# Patient Record
Sex: FEMALE | Race: WHITE | NOT HISPANIC OR LATINO
[De-identification: names, ages, dates, MRNs, and addresses within clinical notes are randomized per-mention and may not be internally consistent; named-entity substitution may affect disease eponyms.]

---

## 2017-02-13 ENCOUNTER — TRANSCRIPTION ENCOUNTER (OUTPATIENT)
Age: 82
End: 2017-02-13

## 2017-03-17 ENCOUNTER — APPOINTMENT (OUTPATIENT)
Dept: NEUROLOGY | Facility: CLINIC | Age: 82
End: 2017-03-17

## 2017-03-20 ENCOUNTER — NON-APPOINTMENT (OUTPATIENT)
Age: 82
End: 2017-03-20

## 2017-03-20 ENCOUNTER — APPOINTMENT (OUTPATIENT)
Dept: CARDIOLOGY | Facility: CLINIC | Age: 82
End: 2017-03-20

## 2017-03-20 VITALS
DIASTOLIC BLOOD PRESSURE: 60 MMHG | BODY MASS INDEX: 31.99 KG/M2 | HEIGHT: 65 IN | OXYGEN SATURATION: 98 % | WEIGHT: 192 LBS | HEART RATE: 76 BPM | SYSTOLIC BLOOD PRESSURE: 93 MMHG

## 2017-03-20 DIAGNOSIS — R06.09 OTHER FORMS OF DYSPNEA: ICD-10-CM

## 2017-03-20 DIAGNOSIS — I95.9 HYPOTENSION, UNSPECIFIED: ICD-10-CM

## 2017-03-20 DIAGNOSIS — I82.409 ACUTE EMBOLISM AND THROMBOSIS OF UNSPECIFIED DEEP VEINS OF UNSPECIFIED LOWER EXTREMITY: ICD-10-CM

## 2017-03-20 DIAGNOSIS — I26.99 OTHER PULMONARY EMBOLISM W/OUT ACUTE COR PULMONALE: ICD-10-CM

## 2017-03-20 DIAGNOSIS — R94.31 ABNORMAL ELECTROCARDIOGRAM [ECG] [EKG]: ICD-10-CM

## 2017-03-20 DIAGNOSIS — R55 SYNCOPE AND COLLAPSE: ICD-10-CM

## 2017-03-20 DIAGNOSIS — R56.9 UNSPECIFIED CONVULSIONS: ICD-10-CM

## 2017-03-20 RX ORDER — FUROSEMIDE 20 MG/1
20 TABLET ORAL
Qty: 90 | Refills: 3 | Status: ACTIVE | COMMUNITY

## 2017-03-20 RX ORDER — DOCUSATE SODIUM 100 MG/1
100 CAPSULE ORAL TWICE DAILY
Refills: 0 | Status: ACTIVE | COMMUNITY

## 2017-03-20 RX ORDER — PANTOPRAZOLE SODIUM 40 MG/1
40 TABLET, DELAYED RELEASE ORAL
Refills: 0 | Status: ACTIVE | COMMUNITY

## 2017-03-20 RX ORDER — AMLODIPINE BESYLATE 2.5 MG/1
2.5 TABLET ORAL DAILY
Qty: 30 | Refills: 11 | Status: ACTIVE | COMMUNITY

## 2017-03-20 RX ORDER — CARVEDILOL 6.25 MG/1
6.25 TABLET, FILM COATED ORAL
Qty: 180 | Refills: 3 | Status: ACTIVE | COMMUNITY

## 2017-03-20 RX ORDER — RISPERIDONE 0.5 MG/1
0.5 TABLET, FILM COATED ORAL
Refills: 0 | Status: ACTIVE | COMMUNITY

## 2017-03-21 ENCOUNTER — OTHER (OUTPATIENT)
Age: 82
End: 2017-03-21

## 2017-03-27 ENCOUNTER — APPOINTMENT (OUTPATIENT)
Dept: CARDIOLOGY | Facility: CLINIC | Age: 82
End: 2017-03-27

## 2017-04-05 ENCOUNTER — APPOINTMENT (OUTPATIENT)
Dept: CARDIOLOGY | Facility: CLINIC | Age: 82
End: 2017-04-05

## 2017-04-28 ENCOUNTER — APPOINTMENT (OUTPATIENT)
Dept: NEUROLOGY | Facility: CLINIC | Age: 82
End: 2017-04-28

## 2017-07-05 ENCOUNTER — APPOINTMENT (OUTPATIENT)
Dept: UROGYNECOLOGY | Facility: CLINIC | Age: 82
End: 2017-07-05

## 2017-07-05 DIAGNOSIS — Z86.19 PERSONAL HISTORY OF OTHER INFECTIOUS AND PARASITIC DISEASES: ICD-10-CM

## 2017-07-05 DIAGNOSIS — E78.5 HYPERLIPIDEMIA, UNSPECIFIED: ICD-10-CM

## 2017-07-05 RX ORDER — NYSTATIN AND TRIAMCINOLONE ACETONIDE 100000; 1 MG/G; MG/G
100000-0.1 CREAM TOPICAL TWICE DAILY
Qty: 1 | Refills: 1 | Status: ACTIVE | COMMUNITY
Start: 2017-07-05 | End: 1900-01-01

## 2020-06-10 ENCOUNTER — OUTPATIENT (OUTPATIENT)
Dept: OUTPATIENT SERVICES | Facility: HOSPITAL | Age: 85
LOS: 1 days | End: 2020-06-10
Payer: MEDICARE

## 2020-06-10 ENCOUNTER — APPOINTMENT (OUTPATIENT)
Dept: MRI IMAGING | Facility: CLINIC | Age: 85
End: 2020-06-10
Payer: MEDICARE

## 2020-06-10 DIAGNOSIS — Z00.8 ENCOUNTER FOR OTHER GENERAL EXAMINATION: ICD-10-CM

## 2020-06-10 PROCEDURE — 70551 MRI BRAIN STEM W/O DYE: CPT | Mod: 26

## 2020-06-10 PROCEDURE — 70551 MRI BRAIN STEM W/O DYE: CPT

## 2022-08-07 ENCOUNTER — INPATIENT (INPATIENT)
Facility: HOSPITAL | Age: 87
LOS: 1 days | Discharge: HOME | DRG: 640 | End: 2022-08-09
Attending: FAMILY MEDICINE | Admitting: INTERNAL MEDICINE
Payer: MEDICARE

## 2022-08-07 VITALS
DIASTOLIC BLOOD PRESSURE: 50 MMHG | SYSTOLIC BLOOD PRESSURE: 80 MMHG | RESPIRATION RATE: 16 BRPM | WEIGHT: 184.97 LBS | OXYGEN SATURATION: 95 % | TEMPERATURE: 97 F | HEART RATE: 56 BPM

## 2022-08-07 DIAGNOSIS — R55 SYNCOPE AND COLLAPSE: ICD-10-CM

## 2022-08-07 DIAGNOSIS — I10 ESSENTIAL (PRIMARY) HYPERTENSION: ICD-10-CM

## 2022-08-07 DIAGNOSIS — G30.9 ALZHEIMER'S DISEASE, UNSPECIFIED: ICD-10-CM

## 2022-08-07 DIAGNOSIS — G93.41 METABOLIC ENCEPHALOPATHY: ICD-10-CM

## 2022-08-07 DIAGNOSIS — Z29.9 ENCOUNTER FOR PROPHYLACTIC MEASURES, UNSPECIFIED: ICD-10-CM

## 2022-08-07 DIAGNOSIS — Z86.711 PERSONAL HISTORY OF PULMONARY EMBOLISM: ICD-10-CM

## 2022-08-07 DIAGNOSIS — Z95.828 PRESENCE OF OTHER VASCULAR IMPLANTS AND GRAFTS: Chronic | ICD-10-CM

## 2022-08-07 DIAGNOSIS — E03.9 HYPOTHYROIDISM, UNSPECIFIED: ICD-10-CM

## 2022-08-07 LAB
ALBUMIN SERPL ELPH-MCNC: 3 G/DL — LOW (ref 3.3–5)
ALP SERPL-CCNC: 90 U/L — SIGNIFICANT CHANGE UP (ref 40–120)
ALT FLD-CCNC: 10 U/L — LOW (ref 12–78)
ANION GAP SERPL CALC-SCNC: 9 MMOL/L — SIGNIFICANT CHANGE UP (ref 5–17)
APPEARANCE UR: CLEAR — SIGNIFICANT CHANGE UP
AST SERPL-CCNC: 18 U/L — SIGNIFICANT CHANGE UP (ref 15–37)
BILIRUB SERPL-MCNC: 0.5 MG/DL — SIGNIFICANT CHANGE UP (ref 0.2–1.2)
BILIRUB UR-MCNC: NEGATIVE — SIGNIFICANT CHANGE UP
BUN SERPL-MCNC: 32 MG/DL — HIGH (ref 7–23)
CALCIUM SERPL-MCNC: 9.4 MG/DL — SIGNIFICANT CHANGE UP (ref 8.5–10.1)
CHLORIDE SERPL-SCNC: 104 MMOL/L — SIGNIFICANT CHANGE UP (ref 96–108)
CO2 SERPL-SCNC: 24 MMOL/L — SIGNIFICANT CHANGE UP (ref 22–31)
COLOR SPEC: YELLOW — SIGNIFICANT CHANGE UP
CREAT SERPL-MCNC: 1.5 MG/DL — HIGH (ref 0.5–1.3)
DIFF PNL FLD: ABNORMAL
EGFR: 33 ML/MIN/1.73M2 — LOW
GLUCOSE SERPL-MCNC: 192 MG/DL — HIGH (ref 70–99)
GLUCOSE UR QL: NEGATIVE — SIGNIFICANT CHANGE UP
HCT VFR BLD CALC: 34.8 % — SIGNIFICANT CHANGE UP (ref 34.5–45)
HGB BLD-MCNC: 11 G/DL — LOW (ref 11.5–15.5)
KETONES UR-MCNC: ABNORMAL
LEUKOCYTE ESTERASE UR-ACNC: ABNORMAL
MCHC RBC-ENTMCNC: 30.2 PG — SIGNIFICANT CHANGE UP (ref 27–34)
MCHC RBC-ENTMCNC: 31.6 GM/DL — LOW (ref 32–36)
MCV RBC AUTO: 95.6 FL — SIGNIFICANT CHANGE UP (ref 80–100)
NITRITE UR-MCNC: NEGATIVE — SIGNIFICANT CHANGE UP
NRBC # BLD: 0 /100 WBCS — SIGNIFICANT CHANGE UP (ref 0–0)
PH UR: 5 — SIGNIFICANT CHANGE UP (ref 5–8)
PLATELET # BLD AUTO: 188 K/UL — SIGNIFICANT CHANGE UP (ref 150–400)
POTASSIUM SERPL-MCNC: 4.7 MMOL/L — SIGNIFICANT CHANGE UP (ref 3.5–5.3)
POTASSIUM SERPL-SCNC: 4.7 MMOL/L — SIGNIFICANT CHANGE UP (ref 3.5–5.3)
PROT SERPL-MCNC: 7.4 G/DL — SIGNIFICANT CHANGE UP (ref 6–8.3)
PROT UR-MCNC: 30 MG/DL
RBC # BLD: 3.64 M/UL — LOW (ref 3.8–5.2)
RBC # FLD: 13 % — SIGNIFICANT CHANGE UP (ref 10.3–14.5)
SODIUM SERPL-SCNC: 137 MMOL/L — SIGNIFICANT CHANGE UP (ref 135–145)
SP GR SPEC: 1.02 — SIGNIFICANT CHANGE UP (ref 1.01–1.02)
TROPONIN I, HIGH SENSITIVITY RESULT: 13.8 NG/L — SIGNIFICANT CHANGE UP
UROBILINOGEN FLD QL: NEGATIVE — SIGNIFICANT CHANGE UP
WBC # BLD: 13.15 K/UL — HIGH (ref 3.8–10.5)
WBC # FLD AUTO: 13.15 K/UL — HIGH (ref 3.8–10.5)

## 2022-08-07 PROCEDURE — 99223 1ST HOSP IP/OBS HIGH 75: CPT | Mod: GC

## 2022-08-07 PROCEDURE — 71045 X-RAY EXAM CHEST 1 VIEW: CPT | Mod: 26

## 2022-08-07 PROCEDURE — 70450 CT HEAD/BRAIN W/O DYE: CPT | Mod: 26,MA

## 2022-08-07 PROCEDURE — 99285 EMERGENCY DEPT VISIT HI MDM: CPT

## 2022-08-07 RX ORDER — QUETIAPINE FUMARATE 200 MG/1
25 TABLET, FILM COATED ORAL AT BEDTIME
Refills: 0 | Status: DISCONTINUED | OUTPATIENT
Start: 2022-08-07 | End: 2022-08-09

## 2022-08-07 RX ORDER — SODIUM CHLORIDE 9 MG/ML
1000 INJECTION INTRAMUSCULAR; INTRAVENOUS; SUBCUTANEOUS
Refills: 0 | Status: DISCONTINUED | OUTPATIENT
Start: 2022-08-07 | End: 2022-08-07

## 2022-08-07 RX ORDER — CEFTRIAXONE 500 MG/1
1000 INJECTION, POWDER, FOR SOLUTION INTRAMUSCULAR; INTRAVENOUS EVERY 24 HOURS
Refills: 0 | Status: DISCONTINUED | OUTPATIENT
Start: 2022-08-07 | End: 2022-08-08

## 2022-08-07 RX ORDER — MEMANTINE HYDROCHLORIDE 10 MG/1
15 TABLET ORAL DAILY
Refills: 0 | Status: DISCONTINUED | OUTPATIENT
Start: 2022-08-07 | End: 2022-08-09

## 2022-08-07 RX ORDER — SODIUM CHLORIDE 9 MG/ML
1000 INJECTION INTRAMUSCULAR; INTRAVENOUS; SUBCUTANEOUS ONCE
Refills: 0 | Status: COMPLETED | OUTPATIENT
Start: 2022-08-07 | End: 2022-08-07

## 2022-08-07 RX ORDER — CARVEDILOL PHOSPHATE 80 MG/1
6.25 CAPSULE, EXTENDED RELEASE ORAL EVERY 12 HOURS
Refills: 0 | Status: DISCONTINUED | OUTPATIENT
Start: 2022-08-07 | End: 2022-08-09

## 2022-08-07 RX ORDER — SODIUM CHLORIDE 9 MG/ML
1000 INJECTION INTRAMUSCULAR; INTRAVENOUS; SUBCUTANEOUS
Refills: 0 | Status: COMPLETED | OUTPATIENT
Start: 2022-08-07 | End: 2022-08-08

## 2022-08-07 RX ORDER — HEPARIN SODIUM 5000 [USP'U]/ML
5000 INJECTION INTRAVENOUS; SUBCUTANEOUS EVERY 8 HOURS
Refills: 0 | Status: DISCONTINUED | OUTPATIENT
Start: 2022-08-07 | End: 2022-08-09

## 2022-08-07 RX ORDER — LOSARTAN POTASSIUM 100 MG/1
50 TABLET, FILM COATED ORAL DAILY
Refills: 0 | Status: DISCONTINUED | OUTPATIENT
Start: 2022-08-07 | End: 2022-08-09

## 2022-08-07 RX ORDER — HYDROCHLOROTHIAZIDE 25 MG
12.5 TABLET ORAL DAILY
Refills: 0 | Status: DISCONTINUED | OUTPATIENT
Start: 2022-08-07 | End: 2022-08-09

## 2022-08-07 RX ORDER — LORATADINE 10 MG/1
10 TABLET ORAL DAILY
Refills: 0 | Status: DISCONTINUED | OUTPATIENT
Start: 2022-08-07 | End: 2022-08-09

## 2022-08-07 RX ORDER — PANTOPRAZOLE SODIUM 20 MG/1
20 TABLET, DELAYED RELEASE ORAL
Refills: 0 | Status: DISCONTINUED | OUTPATIENT
Start: 2022-08-07 | End: 2022-08-09

## 2022-08-07 RX ORDER — SIMVASTATIN 20 MG/1
20 TABLET, FILM COATED ORAL AT BEDTIME
Refills: 0 | Status: DISCONTINUED | OUTPATIENT
Start: 2022-08-07 | End: 2022-08-09

## 2022-08-07 RX ORDER — SODIUM CHLORIDE 9 MG/ML
1000 INJECTION INTRAMUSCULAR; INTRAVENOUS; SUBCUTANEOUS ONCE
Refills: 0 | Status: DISCONTINUED | OUTPATIENT
Start: 2022-08-07 | End: 2022-08-07

## 2022-08-07 RX ORDER — LEVOTHYROXINE SODIUM 125 MCG
112 TABLET ORAL DAILY
Refills: 0 | Status: DISCONTINUED | OUTPATIENT
Start: 2022-08-07 | End: 2022-08-09

## 2022-08-07 RX ORDER — DONEPEZIL HYDROCHLORIDE 10 MG/1
10 TABLET, FILM COATED ORAL AT BEDTIME
Refills: 0 | Status: DISCONTINUED | OUTPATIENT
Start: 2022-08-07 | End: 2022-08-08

## 2022-08-07 RX ORDER — ASPIRIN/CALCIUM CARB/MAGNESIUM 324 MG
81 TABLET ORAL DAILY
Refills: 0 | Status: DISCONTINUED | OUTPATIENT
Start: 2022-08-07 | End: 2022-08-09

## 2022-08-07 RX ORDER — CEFTRIAXONE 500 MG/1
1000 INJECTION, POWDER, FOR SOLUTION INTRAMUSCULAR; INTRAVENOUS EVERY 24 HOURS
Refills: 0 | Status: DISCONTINUED | OUTPATIENT
Start: 2022-08-07 | End: 2022-08-07

## 2022-08-07 RX ADMIN — CARVEDILOL PHOSPHATE 6.25 MILLIGRAM(S): 80 CAPSULE, EXTENDED RELEASE ORAL at 20:08

## 2022-08-07 RX ADMIN — SIMVASTATIN 20 MILLIGRAM(S): 20 TABLET, FILM COATED ORAL at 21:46

## 2022-08-07 RX ADMIN — QUETIAPINE FUMARATE 25 MILLIGRAM(S): 200 TABLET, FILM COATED ORAL at 21:47

## 2022-08-07 RX ADMIN — SODIUM CHLORIDE 1000 MILLILITER(S): 9 INJECTION INTRAMUSCULAR; INTRAVENOUS; SUBCUTANEOUS at 14:20

## 2022-08-07 RX ADMIN — SODIUM CHLORIDE 100 MILLILITER(S): 9 INJECTION INTRAMUSCULAR; INTRAVENOUS; SUBCUTANEOUS at 19:52

## 2022-08-07 RX ADMIN — HEPARIN SODIUM 5000 UNIT(S): 5000 INJECTION INTRAVENOUS; SUBCUTANEOUS at 21:50

## 2022-08-07 RX ADMIN — DONEPEZIL HYDROCHLORIDE 10 MILLIGRAM(S): 10 TABLET, FILM COATED ORAL at 21:46

## 2022-08-07 RX ADMIN — CEFTRIAXONE 100 MILLIGRAM(S): 500 INJECTION, POWDER, FOR SOLUTION INTRAMUSCULAR; INTRAVENOUS at 19:52

## 2022-08-07 NOTE — H&P ADULT - HISTORY OF PRESENT ILLNESS
58 yo female with PMHx of alzheimers dementia, HTN, hypothyroidism, PE (s/p IVC filter 6-7 yrs ago) presents to the ED after 2 episodes of slumping over at home. History obtained from the son. Son states that pt had 2 episodes of slumping over for a few seconds. First episode was witnessed by pts . States that pt got tired during a conversation, slumped over a little bit with her eyes closed, and when sat up by family member she woke up and spit up. Second episode was witnessed by son. Son stated that she slumped over, closed her eyes, and then when he sat her up she vomited watery clear fluid. Family states that pt has chronically decreased PO intake.  states that she gets about 8 oz of water a day. Family states that this has never happened in the past. No recent sick contacts.     ED course  Vitals: T 97.2 , HR 56 , BP 80/50, RR 16, SpO2 95 % on RA  Significant labs: WBC 13.15, H/H 11.0/34.8, BUN 32, Cr 1.5  UA showed trace ketones, 30 protein, moderate leuk esterase, trace blood, occasional bacteria  Imaging: CT showed mild periventricular white matter ischemia   EKG: f/u   In ED patient given: NS bolus x2    58 yo female with PMHx of alzheimers dementia, HTN, hypothyroidism, PE (s/p IVC filter 6-7 yrs ago) presents to the ED after 2 episodes of slumping over at home. History obtained from the son. Son states that pt had 2 episodes of slumping over for a few seconds. First episode was witnessed by pts . States that pt got tired during a conversation, slumped over a little bit with her eyes closed, and when sat up by family member she woke up and spit up. Second episode was witnessed by son. Son stated that she slumped over, closed her eyes, and then when he sat her up she vomited watery clear fluid. Family states that pt has chronically decreased PO intake.  states that she gets about 8 oz of water a day. Family states that this has never happened in the past. No recent sick contacts.     ED course  Vitals: T 97.2 , HR 56 , BP 80/50, RR 16, SpO2 95 % on RA  Significant labs: WBC 13.15, H/H 11.0/34.8, BUN 32, Cr 1.5  UA showed trace ketones, 30 protein, moderate leuk esterase, trace blood, occasional bacteria  Imaging: CT showed mild periventricular white matter ischemia   EKG performed   In ED patient given: NS bolus x2

## 2022-08-07 NOTE — H&P ADULT - ATTENDING COMMENTS
58 yo female with PMHx of alzheimers dementia, HTN, hypothyroidism, PE (s/p IVC filter 6-7 yrs ago) presents to the ED after 2 episodes of slumping over at home. Admitted for metabolic encephalopathy, and what appears to have been syncopal episodes at home    CT head negative.  Pt s/p 2 L NS boluses in ED, will cont IVF NS, 2d echo, carotid dopplers, monitor on tele, speech eval; cards consult, neuro consult.  UA positive for UTI, started on ceftriaxone, may be the etiology of syncope.   CXR as pt has been c/o cough, but ove rlats several weeks, not acutely, and speech eval to r/o aspiration.    Brayden Matt, Attending Physician

## 2022-08-07 NOTE — H&P ADULT - PROBLEM SELECTOR PLAN 2
INTERDISCIPLINARY PLAN OF CARE CONFERENCE    Date/Time: 4/16/2021 12:06 PM  Completed by: Sandra Nicole Case Management      Patient Name:  Beatriz Mcneal  YOB: 1960  Admitting Diagnosis: Bradycardia [R00.1]     Admit Date/Time:  4/15/2021  4:08 PM    Chart reviewed. Interdisciplinary team contacted or reviewed plan related to patient progress and discharge plans. Disciplines included Case Management, Nursing, and Dietitian. Discharge Plan Comments: Reviewed chart. Review of chart for any potential discharge needs. No needs identified for discharge intervention at this time. MD and bedside RN  if needs arise please consult case management for discharge intervention. CM not following at this time. Pt is on RA at 98%. [facility] info was placed in d/c instructions for pt to make appt.
- chronic   - continue home memantine and donepezil  - Aspiration precautions

## 2022-08-07 NOTE — H&P ADULT - NSHPSOCIALHISTORY_GEN_ALL_CORE
Tobacco: none  EtOH: none  Recreational drug use: none  Lives with:   Ambulates: wheelchair  ADLs: needs assistance

## 2022-08-07 NOTE — PATIENT PROFILE ADULT - FALL HARM RISK - CONCLUSION
[FreeTextEntry1] : Mrs. Patrick is a 64 Fall with Harm Risk

## 2022-08-07 NOTE — PATIENT PROFILE ADULT - VISION (WITH CORRECTIVE LENSES IF THE PATIENT USUALLY WEARS THEM):
Glasses at home/Partially impaired: cannot see medication labels or newsprint, but can see obstacles in path, and the surrounding layout; can count fingers at arm's length

## 2022-08-07 NOTE — H&P ADULT - PROBLEM SELECTOR PLAN 3
- BP on admission 80/50  - Hold BP meds in setting of hypotension   - restart meds when pt hemodynamically stable   - monitor hemodynamics - BP on admission 80/50  - Hold BP meds in setting of hypotension   - consider restart meds when pt hemodynamically stable   - monitor hemodynamics

## 2022-08-07 NOTE — ED PROVIDER NOTE - OBJECTIVE STATEMENT
88 yo with female with Dementia who per son (witness to events) saw patient=mother suddenly slump over in chair for a period of a few minutes at which time she was unresponsive. A similar episode happened shortly thereafter at which time EMS was called who transported patient to ED. Per son patient has not be taking in enough fluids. Denies any recent falls or head injury. States that her mental status is at baseline at this time and their have not been any recent changes in her mental status. No other history is available at this time.

## 2022-08-07 NOTE — H&P ADULT - NSHPPHYSICALEXAM_GEN_ALL_CORE
T(C): 36.6 (08-07-22 @ 14:17), Max: 36.6 (08-07-22 @ 14:17)  HR: 56 (08-07-22 @ 13:48) (56 - 56)  BP: 80/50 (08-07-22 @ 13:48) (80/50 - 80/50)  RR: 16 (08-07-22 @ 13:48) (16 - 16)  SpO2: 95% (08-07-22 @ 13:48) (95% - 95%)    GENERAL: patient appears lethargic, no acute distress  EYES: sclera clear, no exudates  ENMT: oropharynx clear without erythema, no exudates, moist mucous membranes  NECK: supple, soft  LUNGS: good air entry bilaterally, clear to auscultation, symmetric breath sounds, no wheezing or rhonchi appreciated  HEART: soft S1/S2, regular rate and rhythm, no murmurs noted, no lower extremity edema  GASTROINTESTINAL: abdomen is soft, nontender, nondistended, normoactive bowel sounds  INTEGUMENT: poor skin turgor, warm skin, appears dehydrated   MUSCULOSKELETAL: no clubbing or cyanosis, no obvious deformity  EXTREMITIES: +1 pitting edema BL LE (chronic per family)  NEUROLOGIC: awake, alert, oriented x0, good muscle tone in all 4 extremities  HEME/LYMPH: no obvious ecchymosis or petechiae

## 2022-08-07 NOTE — ED ADULT NURSE NOTE - OBJECTIVE STATEMENT
patient came in ED from home BIBA with c/o possible syncope as per the Son. Son stated patient was seated on her chair at home when he noticed her slumped over to the left side and unresponsive. 911 was called. as soon as EMS came patient became responsive and refused to go with them. on arrival, patient was noted awake, confused. cooperative. straight cath done for urine specimen, no urine output noted.

## 2022-08-07 NOTE — H&P ADULT - PROBLEM SELECTOR PLAN 1
- pt came into the ED after 2 episodes of syncope and vomiting clear watery liquid (witnessed)   - likely due to UTI/dehydration  - CT head showen mild periventricular white matter ischemia   - UA showed trace ketones, 30 protein, mod leuk est, trace blood, occasional bacteria  - On admission, WBC count 13.15, H/H 11.0/34.8   - s/p NS bolus x 2 in ED   - start IVF  cc/hr   - monitor hemodynamics - pt came into the ED after 2 episodes of syncope and vomiting clear watery liquid (witnessed)   - does not meet sepsis criteria  - chronic low PO intake   - likely due to UTI/dehydration  - CT head showed mild periventricular white matter ischemia   - UA showed trace ketones, 30 protein, mod leuk est, trace blood, occasional bacteria  - On admission, WBC count 13.15, H/H 11.0/34.8   - s/p NS bolus x 2 in ED   - start IVF  cc/hr   - monitor hemodynamics - pt came into the ED after 2 episodes of syncope and vomiting clear watery liquid (witnessed)   - does not meet sepsis criteria  - chronic low PO intake   - likely due to UTI/dehydration  - CT head showed mild periventricular white matter ischemia   - UA showed trace ketones, 30 protein, mod leuk est, trace blood, occasional bacteria  - On admission, WBC count 13.15, H/H 11.0/34.8   - s/p NS bolus x 2 in ED   - monitor on tele   - start IVF  cc/hr, stop after 12 hrs; monitor volume status   - start Ceftriaxone 1 g x 3 days   - f/u CXR. echo, carotid dopplers   - Speech and Swallow consulted, f/u recs  - PT consulted, f/u recs  - cardio consulted, f/u recs  - neuro consulted, f/u recs  - monitor hemodynamics

## 2022-08-07 NOTE — PATIENT PROFILE ADULT - FALL HARM RISK - HARM RISK INTERVENTIONS

## 2022-08-07 NOTE — H&P ADULT - ASSESSMENT
58 yo female with PMHx of alzheimers dementia, HTN, hypothyroidism, PE (s/p IVC filter 6-7 yrs ago) presents to the ED after 2 episodes of slumping over at home. Admitted for AMS/syncope/weakness. 58 yo female with PMHx of alzheimers dementia, HTN, hypothyroidism, PE (s/p IVC filter 6-7 yrs ago) presents to the ED after 2 episodes of slumping over at home. Admitted for metabolic encephalopathy

## 2022-08-07 NOTE — PATIENT PROFILE ADULT - WILL THE PATIENT ACCEPT THE PFIZER COVID-19 VACCINE IF ELIGIBLE AND IT IS AVAILABLE?
Patient states productive cough x 2 weeks . States green sputum. Patient states:  'I feel weak and short of breath\". Denies asthma hx.
No

## 2022-08-07 NOTE — H&P ADULT - NSICDXPASTMEDICALHX_GEN_ALL_CORE_FT
PAST MEDICAL HISTORY:  Alzheimer's dementia     Dementia     History of pulmonary embolism     HTN (hypertension)     Hypothyroidism

## 2022-08-07 NOTE — ED PROVIDER NOTE - CONSTITUTIONAL, MLM
normal... Chronically ill appearing elderly white female, flat affect, blank type of stare, awake, in no apparent distress.

## 2022-08-08 LAB
ALBUMIN SERPL ELPH-MCNC: 2.5 G/DL — LOW (ref 3.3–5)
ALP SERPL-CCNC: 76 U/L — SIGNIFICANT CHANGE UP (ref 40–120)
ALT FLD-CCNC: 10 U/L — LOW (ref 12–78)
ANION GAP SERPL CALC-SCNC: 7 MMOL/L — SIGNIFICANT CHANGE UP (ref 5–17)
AST SERPL-CCNC: 13 U/L — LOW (ref 15–37)
BASOPHILS # BLD AUTO: 0.02 K/UL — SIGNIFICANT CHANGE UP (ref 0–0.2)
BASOPHILS NFR BLD AUTO: 0.3 % — SIGNIFICANT CHANGE UP (ref 0–2)
BILIRUB SERPL-MCNC: 0.4 MG/DL — SIGNIFICANT CHANGE UP (ref 0.2–1.2)
BUN SERPL-MCNC: 31 MG/DL — HIGH (ref 7–23)
CALCIUM SERPL-MCNC: 8.7 MG/DL — SIGNIFICANT CHANGE UP (ref 8.5–10.1)
CHLORIDE SERPL-SCNC: 109 MMOL/L — HIGH (ref 96–108)
CO2 SERPL-SCNC: 25 MMOL/L — SIGNIFICANT CHANGE UP (ref 22–31)
CREAT SERPL-MCNC: 1.1 MG/DL — SIGNIFICANT CHANGE UP (ref 0.5–1.3)
EGFR: 48 ML/MIN/1.73M2 — LOW
EOSINOPHIL # BLD AUTO: 0.15 K/UL — SIGNIFICANT CHANGE UP (ref 0–0.5)
EOSINOPHIL NFR BLD AUTO: 2.3 % — SIGNIFICANT CHANGE UP (ref 0–6)
GLUCOSE SERPL-MCNC: 87 MG/DL — SIGNIFICANT CHANGE UP (ref 70–99)
HCT VFR BLD CALC: 30.2 % — LOW (ref 34.5–45)
HGB BLD-MCNC: 9.8 G/DL — LOW (ref 11.5–15.5)
IMM GRANULOCYTES NFR BLD AUTO: 0.5 % — SIGNIFICANT CHANGE UP (ref 0–1.5)
LYMPHOCYTES # BLD AUTO: 1.88 K/UL — SIGNIFICANT CHANGE UP (ref 1–3.3)
LYMPHOCYTES # BLD AUTO: 28.7 % — SIGNIFICANT CHANGE UP (ref 13–44)
MCHC RBC-ENTMCNC: 30.4 PG — SIGNIFICANT CHANGE UP (ref 27–34)
MCHC RBC-ENTMCNC: 32.5 GM/DL — SIGNIFICANT CHANGE UP (ref 32–36)
MCV RBC AUTO: 93.8 FL — SIGNIFICANT CHANGE UP (ref 80–100)
MONOCYTES # BLD AUTO: 0.6 K/UL — SIGNIFICANT CHANGE UP (ref 0–0.9)
MONOCYTES NFR BLD AUTO: 9.2 % — SIGNIFICANT CHANGE UP (ref 2–14)
NEUTROPHILS # BLD AUTO: 3.87 K/UL — SIGNIFICANT CHANGE UP (ref 1.8–7.4)
NEUTROPHILS NFR BLD AUTO: 59 % — SIGNIFICANT CHANGE UP (ref 43–77)
NRBC # BLD: 0 /100 WBCS — SIGNIFICANT CHANGE UP (ref 0–0)
PLATELET # BLD AUTO: 128 K/UL — LOW (ref 150–400)
POTASSIUM SERPL-MCNC: 4 MMOL/L — SIGNIFICANT CHANGE UP (ref 3.5–5.3)
POTASSIUM SERPL-SCNC: 4 MMOL/L — SIGNIFICANT CHANGE UP (ref 3.5–5.3)
PROT SERPL-MCNC: 6.5 G/DL — SIGNIFICANT CHANGE UP (ref 6–8.3)
RBC # BLD: 3.22 M/UL — LOW (ref 3.8–5.2)
RBC # FLD: 12.5 % — SIGNIFICANT CHANGE UP (ref 10.3–14.5)
SARS-COV-2 RNA SPEC QL NAA+PROBE: SIGNIFICANT CHANGE UP
SODIUM SERPL-SCNC: 141 MMOL/L — SIGNIFICANT CHANGE UP (ref 135–145)
WBC # BLD: 6.55 K/UL — SIGNIFICANT CHANGE UP (ref 3.8–10.5)
WBC # FLD AUTO: 6.55 K/UL — SIGNIFICANT CHANGE UP (ref 3.8–10.5)

## 2022-08-08 PROCEDURE — 93306 TTE W/DOPPLER COMPLETE: CPT | Mod: 26

## 2022-08-08 PROCEDURE — 93880 EXTRACRANIAL BILAT STUDY: CPT | Mod: 26

## 2022-08-08 PROCEDURE — 99233 SBSQ HOSP IP/OBS HIGH 50: CPT

## 2022-08-08 PROCEDURE — 99222 1ST HOSP IP/OBS MODERATE 55: CPT

## 2022-08-08 PROCEDURE — 93010 ELECTROCARDIOGRAM REPORT: CPT

## 2022-08-08 RX ORDER — ASPIRIN/CALCIUM CARB/MAGNESIUM 324 MG
1 TABLET ORAL
Qty: 0 | Refills: 0 | DISCHARGE

## 2022-08-08 RX ORDER — LOSARTAN POTASSIUM 100 MG/1
1 TABLET, FILM COATED ORAL
Qty: 0 | Refills: 0 | DISCHARGE

## 2022-08-08 RX ORDER — MEMANTINE HYDROCHLORIDE 10 MG/1
1 TABLET ORAL
Qty: 0 | Refills: 0 | DISCHARGE

## 2022-08-08 RX ORDER — QUETIAPINE FUMARATE 200 MG/1
0 TABLET, FILM COATED ORAL
Qty: 0 | Refills: 0 | DISCHARGE

## 2022-08-08 RX ORDER — SIMVASTATIN 20 MG/1
1 TABLET, FILM COATED ORAL
Qty: 0 | Refills: 0 | DISCHARGE

## 2022-08-08 RX ORDER — LEVOTHYROXINE SODIUM 125 MCG
1 TABLET ORAL
Qty: 0 | Refills: 0 | DISCHARGE

## 2022-08-08 RX ORDER — CARVEDILOL PHOSPHATE 80 MG/1
1 CAPSULE, EXTENDED RELEASE ORAL
Qty: 0 | Refills: 0 | DISCHARGE

## 2022-08-08 RX ADMIN — SODIUM CHLORIDE 100 MILLILITER(S): 9 INJECTION INTRAMUSCULAR; INTRAVENOUS; SUBCUTANEOUS at 06:51

## 2022-08-08 RX ADMIN — HEPARIN SODIUM 5000 UNIT(S): 5000 INJECTION INTRAVENOUS; SUBCUTANEOUS at 22:20

## 2022-08-08 RX ADMIN — SIMVASTATIN 20 MILLIGRAM(S): 20 TABLET, FILM COATED ORAL at 22:20

## 2022-08-08 RX ADMIN — HEPARIN SODIUM 5000 UNIT(S): 5000 INJECTION INTRAVENOUS; SUBCUTANEOUS at 05:16

## 2022-08-08 RX ADMIN — PANTOPRAZOLE SODIUM 20 MILLIGRAM(S): 20 TABLET, DELAYED RELEASE ORAL at 06:48

## 2022-08-08 RX ADMIN — CARVEDILOL PHOSPHATE 6.25 MILLIGRAM(S): 80 CAPSULE, EXTENDED RELEASE ORAL at 18:51

## 2022-08-08 RX ADMIN — QUETIAPINE FUMARATE 25 MILLIGRAM(S): 200 TABLET, FILM COATED ORAL at 22:21

## 2022-08-08 RX ADMIN — CARVEDILOL PHOSPHATE 6.25 MILLIGRAM(S): 80 CAPSULE, EXTENDED RELEASE ORAL at 05:16

## 2022-08-08 RX ADMIN — CEFTRIAXONE 100 MILLIGRAM(S): 500 INJECTION, POWDER, FOR SOLUTION INTRAMUSCULAR; INTRAVENOUS at 18:51

## 2022-08-08 RX ADMIN — LOSARTAN POTASSIUM 50 MILLIGRAM(S): 100 TABLET, FILM COATED ORAL at 05:28

## 2022-08-08 RX ADMIN — Medication 112 MICROGRAM(S): at 06:47

## 2022-08-08 RX ADMIN — HEPARIN SODIUM 5000 UNIT(S): 5000 INJECTION INTRAVENOUS; SUBCUTANEOUS at 14:10

## 2022-08-08 RX ADMIN — Medication 12.5 MILLIGRAM(S): at 05:16

## 2022-08-08 NOTE — PHYSICAL THERAPY INITIAL EVALUATION ADULT - PERTINENT HX OF CURRENT PROBLEM, REHAB EVAL
89 yo with female with Dementia who per son (witness to events) saw patient=mother suddenly slump over in chair for a period of a few minutes at which time she was unresponsive. A similar episode happened shortly thereafter at which time EMS was called who transported patient to ED. Per son patient has not be taking in enough fluids. Denies any recent falls or head injury. States that her mental status is at baseline at this time and their have not been any recent changes in her mental status. No other history is available at this time.

## 2022-08-08 NOTE — PROGRESS NOTE ADULT - ASSESSMENT
60 yo female with PMHx of alzheimers dementia, HTN, hypothyroidism, PE (s/p IVC filter 6-7 yrs ago) presents to the ED after 2 episodes of slumping over at home. Admitted for metabolic encephalopathy

## 2022-08-08 NOTE — PROGRESS NOTE ADULT - SUBJECTIVE AND OBJECTIVE BOX
neuro cons dict  seen for loc/slumping  likely syncope  doubt cva;   dementia  ct head- negative cva  cardiology eval   check for orthostasis

## 2022-08-08 NOTE — CONSULT NOTE ADULT - SUBJECTIVE AND OBJECTIVE BOX
[facility] - Мария Fierro, Maddie, Benita, Darryl, Donald Higuera  Office Number: [phone]    Initial Consult Note    CHIEF COMPLAINT: Patient is a 89y old  Female who presents with a chief complaint of AMS        HPI:  88 yo female with PMHx of alzheimers dementia, HTN, hypothyroidism, PE (s/p IVC filter 6-7 yrs ago) presents to the ED after 2 episodes of slumping over at home. History obtained from the son. Son states that pt had 2 episodes of slumping over for a few seconds. First episode was witnessed by pts . States that pt got tired during a conversation, slumped over a little bit with her eyes closed, and when sat up by family member she woke up and spit up. Second episode was witnessed by son. Son stated that she slumped over, closed her eyes, and then when he sat her up she vomited watery clear fluid. Family states that pt has chronically decreased PO intake.  states that she gets about 8 oz of water a day. Family states that this has never happened in the past. No recent sick contacts.     ED course  Vitals: T 97.2 , HR 56 , BP 80/50, RR 16, SpO2 95 % on RA  Significant labs: WBC 13.15, H/H 11.0/34.8, BUN 32, Cr 1.5  UA showed trace ketones, 30 protein, moderate leuk esterase, trace blood, occasional bacteria  Imaging: CT showed mild periventricular white matter ischemia   EKG performed   In ED patient given: NS bolus x2       Patient unable to provide any meaningful history on my exam.       PAST MEDICAL & SURGICAL HISTORY:  Dementia      Alzheimer&#x27;s dementia      HTN (hypertension)      Hypothyroidism      History of pulmonary embolism      S/P IVC filter          SOCIAL HISTORY:  No tobacco, ethanol, or drug abuse.    FAMILY HISTORY:    No family history of acute MI or sudden cardiac death.    MEDICATIONS  (STANDING):  aspirin  chewable 81 milliGRAM(s) Oral daily  carvedilol 6.25 milliGRAM(s) Oral every 12 hours  cefTRIAXone   IVPB 1000 milliGRAM(s) IV Intermittent every 24 hours  donepezil 10 milliGRAM(s) Oral at bedtime  heparin   Injectable 5000 Unit(s) SubCutaneous every 8 hours  hydrochlorothiazide 12.5 milliGRAM(s) Oral daily  levothyroxine 112 MICROGram(s) Oral daily  loratadine 10 milliGRAM(s) Oral daily  losartan 50 milliGRAM(s) Oral daily  memantine 15 milliGRAM(s) Oral daily  pantoprazole    Tablet 20 milliGRAM(s) Oral before breakfast  QUEtiapine 25 milliGRAM(s) Oral at bedtime  simvastatin 20 milliGRAM(s) Oral at bedtime    MEDICATIONS  (PRN):      Allergies    No Known Allergies    Intolerances        REVIEW OF SYSTEMS:       All other review of systems is negative unless indicated above or unable to obtain    VITAL SIGNS:   Vital Signs Last 24 Hrs  T(C): 36.5 (08 Aug 2022 05:28), Max: 36.6 (07 Aug 2022 14:17)  T(F): 97.7 (08 Aug 2022 05:28), Max: 97.9 (07 Aug 2022 14:17)  HR: 60 (08 Aug 2022 05:28) (56 - 65)  BP: 128/60 (08 Aug 2022 05:28) (80/50 - 144/65)  BP(mean): --  RR: 18 (08 Aug 2022 05:28) (16 - 18)  SpO2: 97% (08 Aug 2022 02:35) (95% - 99%)    Parameters below as of 08 Aug 2022 05:28  Patient On (Oxygen Delivery Method): room air        I&O's Summary      On Exam:    Constitutional: NAD, alert and awake  Lungs:  Non-labored, breath sounds are clear bilaterally, No wheezing, rales or rhonchi  Cardiovascular: RRR.  S1 and S2 positive.  No murmurs, rubs, gallops or clicks  Gastrointestinal: Bowel Sounds present, soft, nontender.   Lymph: No peripheral edema. No cervical lymphadenopathy.  Neurological: Unable to properly assess  Skin: No rashes or ulcers   Psych:  Unable to properly assess    LABS: All Labs Reviewed:                        9.8    6.55  )-----------( 128      ( 08 Aug 2022 06:10 )             30.2                         11.0   13.15 )-----------( 188      ( 07 Aug 2022 15:35 )             34.8     08 Aug 2022 06:10    141    |  109    |  31     ----------------------------<  87     4.0     |  25     |  1.10   07 Aug 2022 15:35    137    |  104    |  32     ----------------------------<  192    4.7     |  24     |  1.50     Ca    8.7        08 Aug 2022 06:10  Ca    9.4        07 Aug 2022 15:35    TPro  6.5    /  Alb  2.5    /  TBili  0.4    /  DBili  x      /  AST  13     /  ALT  10     /  AlkPhos  76     08 Aug 2022 06:10  TPro  7.4    /  Alb  3.0    /  TBili  0.5    /  DBili  x      /  AST  18     /  ALT  10     /  AlkPhos  90     07 Aug 2022 15:35            RADIOLOGY:    EKG:  No EKG available for review

## 2022-08-08 NOTE — PROGRESS NOTE ADULT - PROBLEM SELECTOR PLAN 1
- pt came into the ED after 2 episodes of syncope and vomiting clear watery liquid (witnessed)   - suspect syncope in setting of hypovolemia/cerebral hypoperfusion. Doubt CVA.   - chronic low PO intake   - likely due to dehydration, less likely UTI.  - CT head showed mild periventricular white matter ischemia   - UA showed trace ketones, 30 protein, mod leuk est, trace blood, occasional bacteria  - On admission, WBC count 13.15, H/H 11.0/34.8   - s/p NS bolus x 2 in ED   - monitor on tele   - s/p IV fluids.   - Started on Ceftriaxone 1 g q24h at time of admission, will d/c and monitor for any signs of infection.   - f/u CXR  - f/u echo read  - carotid dopplers negative for significant stenosis  - Speech and Swallow consulted, f/u recs  - PT consulted, rec is OLAYINKA, likely at this time.   - Cardio Pannella and Neuro Dr. Stout following.   - monitor hemodynamics

## 2022-08-08 NOTE — ED ADULT NURSE REASSESSMENT NOTE - NSIMPLEMENTINTERV_GEN_ALL_ED
Implemented All Fall with Harm Risk Interventions:  [place] to call system. Call bell, personal items and telephone within reach. Instruct patient to call for assistance. Room bathroom lighting operational. Non-slip footwear when patient is off stretcher. Physically safe environment: no spills, clutter or unnecessary equipment. Stretcher in lowest position, wheels locked, appropriate side rails in place. Provide visual cue, wrist band, yellow gown, etc. Monitor gait and stability. Monitor for mental status changes and reorient to person, place, and time. Review medications for side effects contributing to fall risk. Reinforce activity limits and safety measures with patient and family. Provide visual clues: red socks.

## 2022-08-08 NOTE — CONSULT NOTE ADULT - ASSESSMENT
89 yo female with PMHx of alzheimers dementia, HTN, hypothyroidism, PE (s/p IVC filter 6-7 yrs ago) presents to the ED after 2 episodes of slumping over at home.     - Unclear if she actually had syncope  - Monitor on telemetry  - Check EKG.  No EKG available in patient chart  - Echo and carotid Doppler pending  - Continue aspirin  - Continue carvedilol 6.25 bid  - Continue losartan/hctz  - continue statin  - Caution with IVF hydration  - Monitor and replete lytes  - To follow closely while admitted

## 2022-08-09 ENCOUNTER — TRANSCRIPTION ENCOUNTER (OUTPATIENT)
Age: 87
End: 2022-08-09

## 2022-08-09 VITALS
RESPIRATION RATE: 16 BRPM | OXYGEN SATURATION: 91 % | DIASTOLIC BLOOD PRESSURE: 83 MMHG | SYSTOLIC BLOOD PRESSURE: 155 MMHG | TEMPERATURE: 98 F | HEART RATE: 60 BPM

## 2022-08-09 LAB
ANION GAP SERPL CALC-SCNC: 9 MMOL/L — SIGNIFICANT CHANGE UP (ref 5–17)
BUN SERPL-MCNC: 24 MG/DL — HIGH (ref 7–23)
CALCIUM SERPL-MCNC: 9 MG/DL — SIGNIFICANT CHANGE UP (ref 8.5–10.1)
CHLORIDE SERPL-SCNC: 108 MMOL/L — SIGNIFICANT CHANGE UP (ref 96–108)
CO2 SERPL-SCNC: 23 MMOL/L — SIGNIFICANT CHANGE UP (ref 22–31)
CREAT SERPL-MCNC: 0.84 MG/DL — SIGNIFICANT CHANGE UP (ref 0.5–1.3)
EGFR: 66 ML/MIN/1.73M2 — SIGNIFICANT CHANGE UP
GLUCOSE SERPL-MCNC: 90 MG/DL — SIGNIFICANT CHANGE UP (ref 70–99)
HCT VFR BLD CALC: 30.3 % — LOW (ref 34.5–45)
HGB BLD-MCNC: 9.8 G/DL — LOW (ref 11.5–15.5)
MCHC RBC-ENTMCNC: 30 PG — SIGNIFICANT CHANGE UP (ref 27–34)
MCHC RBC-ENTMCNC: 32.3 GM/DL — SIGNIFICANT CHANGE UP (ref 32–36)
MCV RBC AUTO: 92.7 FL — SIGNIFICANT CHANGE UP (ref 80–100)
NRBC # BLD: 0 /100 WBCS — SIGNIFICANT CHANGE UP (ref 0–0)
PLATELET # BLD AUTO: 143 K/UL — LOW (ref 150–400)
POTASSIUM SERPL-MCNC: 3.9 MMOL/L — SIGNIFICANT CHANGE UP (ref 3.5–5.3)
POTASSIUM SERPL-SCNC: 3.9 MMOL/L — SIGNIFICANT CHANGE UP (ref 3.5–5.3)
RBC # BLD: 3.27 M/UL — LOW (ref 3.8–5.2)
RBC # FLD: 12.6 % — SIGNIFICANT CHANGE UP (ref 10.3–14.5)
SODIUM SERPL-SCNC: 140 MMOL/L — SIGNIFICANT CHANGE UP (ref 135–145)
WBC # BLD: 5.52 K/UL — SIGNIFICANT CHANGE UP (ref 3.8–10.5)
WBC # FLD AUTO: 5.52 K/UL — SIGNIFICANT CHANGE UP (ref 3.8–10.5)

## 2022-08-09 PROCEDURE — 92610 EVALUATE SWALLOWING FUNCTION: CPT

## 2022-08-09 PROCEDURE — 93880 EXTRACRANIAL BILAT STUDY: CPT

## 2022-08-09 PROCEDURE — 99285 EMERGENCY DEPT VISIT HI MDM: CPT

## 2022-08-09 PROCEDURE — 84484 ASSAY OF TROPONIN QUANT: CPT

## 2022-08-09 PROCEDURE — 97162 PT EVAL MOD COMPLEX 30 MIN: CPT

## 2022-08-09 PROCEDURE — 99239 HOSP IP/OBS DSCHRG MGMT >30: CPT

## 2022-08-09 PROCEDURE — U0005: CPT

## 2022-08-09 PROCEDURE — U0003: CPT

## 2022-08-09 PROCEDURE — 80048 BASIC METABOLIC PNL TOTAL CA: CPT

## 2022-08-09 PROCEDURE — 99232 SBSQ HOSP IP/OBS MODERATE 35: CPT

## 2022-08-09 PROCEDURE — 93005 ELECTROCARDIOGRAM TRACING: CPT

## 2022-08-09 PROCEDURE — 70450 CT HEAD/BRAIN W/O DYE: CPT | Mod: MA

## 2022-08-09 PROCEDURE — 85025 COMPLETE CBC W/AUTO DIFF WBC: CPT

## 2022-08-09 PROCEDURE — 71045 X-RAY EXAM CHEST 1 VIEW: CPT

## 2022-08-09 PROCEDURE — 80053 COMPREHEN METABOLIC PANEL: CPT

## 2022-08-09 PROCEDURE — 93306 TTE W/DOPPLER COMPLETE: CPT

## 2022-08-09 PROCEDURE — 36415 COLL VENOUS BLD VENIPUNCTURE: CPT

## 2022-08-09 PROCEDURE — 85027 COMPLETE CBC AUTOMATED: CPT

## 2022-08-09 PROCEDURE — 81001 URINALYSIS AUTO W/SCOPE: CPT

## 2022-08-09 RX ORDER — OMEPRAZOLE 10 MG/1
1 CAPSULE, DELAYED RELEASE ORAL
Qty: 0 | Refills: 0 | DISCHARGE

## 2022-08-09 RX ORDER — PANTOPRAZOLE SODIUM 20 MG/1
0 TABLET, DELAYED RELEASE ORAL
Qty: 0 | Refills: 0 | DISCHARGE
Start: 2022-08-09

## 2022-08-09 RX ORDER — DONEPEZIL HYDROCHLORIDE 10 MG/1
1 TABLET, FILM COATED ORAL
Qty: 0 | Refills: 0 | DISCHARGE

## 2022-08-09 RX ADMIN — LOSARTAN POTASSIUM 50 MILLIGRAM(S): 100 TABLET, FILM COATED ORAL at 06:26

## 2022-08-09 RX ADMIN — MEMANTINE HYDROCHLORIDE 15 MILLIGRAM(S): 10 TABLET ORAL at 12:42

## 2022-08-09 RX ADMIN — CARVEDILOL PHOSPHATE 6.25 MILLIGRAM(S): 80 CAPSULE, EXTENDED RELEASE ORAL at 06:26

## 2022-08-09 RX ADMIN — PANTOPRAZOLE SODIUM 20 MILLIGRAM(S): 20 TABLET, DELAYED RELEASE ORAL at 06:26

## 2022-08-09 RX ADMIN — Medication 112 MICROGRAM(S): at 06:27

## 2022-08-09 RX ADMIN — HEPARIN SODIUM 5000 UNIT(S): 5000 INJECTION INTRAVENOUS; SUBCUTANEOUS at 12:43

## 2022-08-09 RX ADMIN — HEPARIN SODIUM 5000 UNIT(S): 5000 INJECTION INTRAVENOUS; SUBCUTANEOUS at 06:25

## 2022-08-09 RX ADMIN — Medication 81 MILLIGRAM(S): at 12:42

## 2022-08-09 RX ADMIN — Medication 12.5 MILLIGRAM(S): at 06:25

## 2022-08-09 NOTE — PROGRESS NOTE ADULT - PROBLEM SELECTOR PLAN 3
Hypotensive at time of presentation.   Now improved.  Continue home meds: Carvedilol, Losartan, HCTZ with parameters.
Hypotensive at time of presentation.   Now improved.  Continue home meds: Carvedilol, Losartan, HCTZ with parameters.

## 2022-08-09 NOTE — DISCHARGE NOTE PROVIDER - HOSPITAL COURSE
HPI:  60 yo female with PMHx of alzheimers dementia, HTN, hypothyroidism, PE (s/p IVC filter 6-7 yrs ago) presents to the ED after 2 episodes of slumping over at home. History obtained from the son. Son states that pt had 2 episodes of slumping over for a few seconds. First episode was witnessed by pts . States that pt got tired during a conversation, slumped over a little bit with her eyes closed, and when sat up by family member she woke up and spit up. Second episode was witnessed by son. Son stated that she slumped over, closed her eyes, and then when he sat her up she vomited watery clear fluid. Family states that pt has chronically decreased PO intake.  states that she gets about 8 oz of water a day. Family states that this has never happened in the past. No recent sick contacts.     ED course  Vitals: T 97.2 , HR 56 , BP 80/50, RR 16, SpO2 95 % on RA  Significant labs: WBC 13.15, H/H 11.0/34.8, BUN 32, Cr 1.5  UA showed trace ketones, 30 protein, moderate leuk esterase, trace blood, occasional bacteria  Imaging: CT showed mild periventricular white matter ischemia   EKG performed   In ED patient given: NS bolus x2    (07 Aug 2022 17:48)      ---  HOSPITAL COURSE:   59 F presented to the ED s/p 2 episodes of syncope. Pt with chronic low PO intake. Syncope in setting of Hypovolemia/Cerebral hypoperfusion suspected. CT head negative for infarct, hemorrhage or mass. UA negative. Pt was given x2 bolus in the ED and started on IV fluids. Carotid Dopplers negative for significant stenosis. Echo within normal limits. Speech and Swallow consulted, advised soft and bite sized meals. PT evaluated patient and recommended OLAYINKA.     ---  CONSULTANTS:   Cardiology consulted: Dr. Andrews  Neuro Consulted: Dr. Stout  Speech and Swallow Consulted.   ---  TIME SPENT:  I, the attending physician, was physically present for the key portions of the evaluation and management (E/M) service provided. The total amount of time spent reviewing the hospital notes, laboratory values, imaging findings, assessing/counseling the patient, discussing with consultant physicians, social work, nursing staff was -- minutes    ---  Primary care provider was made aware of plan for discharge:      [  ] NO     [  ] YES

## 2022-08-09 NOTE — SWALLOW BEDSIDE ASSESSMENT ADULT - ASR SWALLOW RECOMMEND DIAG
CXR Results pending; discussed with Dr. Wren's resident ([phone]) who reported no concern for aspiration/PNA at this time. This department will continue to monitor at bedside

## 2022-08-09 NOTE — SWALLOW BEDSIDE ASSESSMENT ADULT - ASR SWALLOW REFERRAL
Consider RD services if concerned for poor PO intake to ensure adequate daily caloric nutritional intake/Registered Dietitian

## 2022-08-09 NOTE — PROGRESS NOTE ADULT - PROBLEM SELECTOR PLAN 2
- chronic   - continue home memantine and donepezil  - Aspiration precautions
- chronic   - continue home memantine and donepezil  - Aspiration precautions

## 2022-08-09 NOTE — DISCHARGE NOTE NURSING/CASE MANAGEMENT/SOCIAL WORK - NSDCPEFALRISK_GEN_ALL_CORE
For information on Fall & Injury Prevention, visit: [email or web address] OR  [email or web address] OR  https://www.cdc.gov/steadi/patient.html

## 2022-08-09 NOTE — SWALLOW BEDSIDE ASSESSMENT ADULT - ORAL PHASE
Decreased anterior-posterior movement of the bolus/Delayed oral transit time/Lingual stasis Decreased anterior-posterior movement of the bolus/Delayed oral transit time suspected posterior loss at BOT/Decreased anterior-posterior movement of the bolus/Delayed oral transit time

## 2022-08-09 NOTE — SWALLOW BEDSIDE ASSESSMENT ADULT - SWALLOW EVAL: RECOMMENDED DIET
minced & moist and thin liquids via single/small cup sips, aspiration precautions and full assistance during meals, as tolerated

## 2022-08-09 NOTE — DISCHARGE NOTE PROVIDER - NSDCMRMEDTOKEN_GEN_ALL_CORE_FT
Aricept 10 mg oral tablet: 1 tab(s) orally once a day (at bedtime)  aspirin 81 mg oral tablet, chewable: 1 tab(s) orally once a day  carvedilol 6.25 mg oral tablet: 1 tab(s) orally 2 times a day  hydroCHLOROthiazide 12.5 mg oral tablet: 1 tab(s) orally once a day  levothyroxine 112 mcg (0.112 mg) oral tablet: 1 tab(s) orally once a day  losartan 50 mg oral tablet: 1 tab(s) orally once a day  memantine 21 mg oral capsule, extended release: 1 cap(s) orally once a day  omeprazole 20 mg oral delayed release capsule: 1 cap(s) orally once a day  pantoprazole 40 mg oral delayed release tablet:  orally   QUEtiapine 25 mg oral tablet: orally once a day (at bedtime)  simvastatin 20 mg oral tablet: 1 tab(s) orally once a day (at bedtime)   aspirin 81 mg oral tablet, chewable: 1 tab(s) orally once a day  carvedilol 6.25 mg oral tablet: 1 tab(s) orally 2 times a day  hydroCHLOROthiazide 12.5 mg oral tablet: 1 tab(s) orally once a day  levothyroxine 112 mcg (0.112 mg) oral tablet: 1 tab(s) orally once a day  losartan 50 mg oral tablet: 1 tab(s) orally once a day  memantine 21 mg oral capsule, extended release: 1 cap(s) orally once a day  QUEtiapine 25 mg oral tablet: orally once a day (at bedtime)  simvastatin 20 mg oral tablet: 1 tab(s) orally once a day (at bedtime)   aspirin 81 mg oral tablet, chewable: 1 tab(s) orally once a day  carvedilol 6.25 mg oral tablet: 1 tab(s) orally 2 times a day  hydroCHLOROthiazide 12.5 mg oral tablet: 1 tab(s) orally once a day  levothyroxine 112 mcg (0.112 mg) oral tablet: 1 tab(s) orally once a day  losartan 50 mg oral tablet: 1 tab(s) orally once a day  memantine 21 mg oral capsule, extended release: 1 cap(s) orally once a day  Physical therapy : Home PT   QUEtiapine 25 mg oral tablet: orally once a day (at bedtime)  simvastatin 20 mg oral tablet: 1 tab(s) orally once a day (at bedtime)

## 2022-08-09 NOTE — DISCHARGE NOTE NURSING/CASE MANAGEMENT/SOCIAL WORK - PATIENT PORTAL LINK FT
You can access the FollowMyHealth Patient Portal offered by [facility] by registering at the following website: [email or web address]. By joining [facility]’s FollowMyHealth portal, you will also be able to view your health information using other applications (apps) compatible with our system.

## 2022-08-09 NOTE — SWALLOW BEDSIDE ASSESSMENT ADULT - ADDITIONAL RECOMMENDATIONS
This department will continue to follow the patient for diet tolerance/advancement during this admission, as schedule permits.

## 2022-08-09 NOTE — SWALLOW BEDSIDE ASSESSMENT ADULT - SWALLOW EVAL: DIAGNOSIS
1. Mild oral dysphagia for pureed, moderately thick, and mildly thick liquids marked by prolonged manipulation resulting in delayed collection and transport. 2. Mild-moderate oral dysphagia for soft & bite-sized and thin liquids marked by prolonged mastication/manipulation resulting in delayed collection and transport. Trace stasis observed post swallow with solids, which reduced with liquid wash. Suspected posterior loss at BOT noted with thin liquids. 3. Mild pharyngeal dypshagia for pureed, soft & bite-sized solids, moderately thick, mildly thick, and thin liquids marked by suspected delayed pharyngeal swallow trigger and hyolaryngeal elevation noted by digital palpation without evidence of airway penetration/aspiration. 4. Recommend minced & moist and thin liquids via single/small cup sips, aspiration precautions and full assistance during meals, as tolerated. Facilitate upright position, slow pacing, single/small bites/sips, and alternate solids with liquids.

## 2022-08-09 NOTE — SWALLOW BEDSIDE ASSESSMENT ADULT - COMMENTS
Consult received and chart reviewed. Patient seen at bedside this AM for initial assessment of swallow function, at which time she was alert, agreeable, and denied pain. Patient unable to follow low level commands. Vocal quality and speech production WFL. Dry cough noted at baseline.     Per charting, the patient is a "yo female with PMHx of alzheimers dementia, HTN, hypothyroidism, PE (s/p IVC filter 6-7 yrs ago) presents to the ED after 2 episodes of slumping over at home. Admitted for metabolic encephalopathy"    WBC WFL.  CXR 8/7/22, results pending.  CT Head 8/7/22 results revealed "Mild periventricular white matter ischemia."    Discussed results and recommendations with the patient, RN, and called out to MD.

## 2022-08-09 NOTE — PROGRESS NOTE ADULT - SUBJECTIVE AND OBJECTIVE BOX
Patient is a 89y old  Female who presents with a chief complaint of AMS (09 Aug 2022 10:44)      Subjective:  INTERVAL HPI/OVERNIGHT EVENTS: Patient seen and examined at bedside. While asleep, patient was bradycardic in 50's and upon awaking, HR elevated into the 150's. HR was fluctuating between 100-150 but is stable as of this morning in the 70's. Pt is incoherent, mumbling, aggressive and trying to remove lines. Pt is a poor historian. Unable to obtain ROS due to dementia. Pt has no other complaints at this time.     MEDICATIONS  (STANDING):  aspirin  chewable 81 milliGRAM(s) Oral daily  carvedilol 6.25 milliGRAM(s) Oral every 12 hours  heparin   Injectable 5000 Unit(s) SubCutaneous every 8 hours  hydrochlorothiazide 12.5 milliGRAM(s) Oral daily  levothyroxine 112 MICROGram(s) Oral daily  loratadine 10 milliGRAM(s) Oral daily  losartan 50 milliGRAM(s) Oral daily  memantine 15 milliGRAM(s) Oral daily  pantoprazole    Tablet 20 milliGRAM(s) Oral before breakfast  QUEtiapine 25 milliGRAM(s) Oral at bedtime  simvastatin 20 milliGRAM(s) Oral at bedtime    MEDICATIONS  (PRN):      Allergies    No Known Allergies    Intolerances        REVIEW OF SYSTEMS:  CONSTITUTIONAL: No fever or chills  HEENT:  No headache, no sore throat  RESPIRATORY: No cough, wheezing, or shortness of breath  CARDIOVASCULAR: No chest pain, palpitations  GASTROINTESTINAL: No abd pain, nausea, vomiting, or diarrhea  GENITOURINARY: No dysuria, frequency, or hematuria  NEUROLOGICAL: no focal weakness or dizziness  MUSCULOSKELETAL: no myalgias     Objective:  Vital Signs Last 24 Hrs  T(C): 37.2 (09 Aug 2022 04:01), Max: 37.2 (09 Aug 2022 04:01)  T(F): 98.9 (09 Aug 2022 04:01), Max: 98.9 (09 Aug 2022 04:01)  HR: 61 (09 Aug 2022 06:29) (61 - 77)  BP: 130/55 (09 Aug 2022 06:29) (119/71 - 132/69)  BP(mean): --  RR: 17 (09 Aug 2022 04:01) (17 - 17)  SpO2: 96% (09 Aug 2022 04:01) (96% - 98%)    Parameters below as of 09 Aug 2022 04:01  Patient On (Oxygen Delivery Method): room air        GENERAL: A&O 0, patient confused and incoherent.   HEAD:  Atraumatic, Normocephalic  EYES: EOMI, PERRLA, conjunctiva and sclera clear  ENT: Moist mucous membranes  NECK: Supple, No JVD  CHEST/LUNG: Clear to auscultation bilaterally; No rales, rhonchi, wheezing, or rubs. Unlabored respirations  HEART: Regular rate and rhythm; No murmurs, rubs, or gallops  ABDOMEN: Bowel sounds present; Soft, Nontender, Nondistended. No hepatomegaly  EXTREMITIES:  2+ Peripheral Pulses, brisk capillary refill. No clubbing, cyanosis, or edema  NERVOUS SYSTEM:  Alert & Oriented 0, speech clear. No deficits   MSK: FROM all 4 extremities, full and equal strength  SKIN: No rashes or lesions    LABS:                        9.8    5.52  )-----------( 143      ( 09 Aug 2022 08:42 )             30.3     CBC Full  -  ( 09 Aug 2022 08:42 )  WBC Count : 5.52 K/uL  Hemoglobin : 9.8 g/dL  Hematocrit : 30.3 %  Platelet Count - Automated : 143 K/uL  Mean Cell Volume : 92.7 fl  Mean Cell Hemoglobin : 30.0 pg  Mean Cell Hemoglobin Concentration : 32.3 gm/dL  Auto Neutrophil # : x  Auto Lymphocyte # : x  Auto Monocyte # : x  Auto Eosinophil # : x  Auto Basophil # : x  Auto Neutrophil % : x  Auto Lymphocyte % : x  Auto Monocyte % : x  Auto Eosinophil % : x  Auto Basophil % : x      Ca    8.7        08 Aug 2022 06:10        Urinalysis Basic - ( 07 Aug 2022 16:00 )    Color: Yellow / Appearance: Clear / S.025 / pH: x  Gluc: x / Ketone: Trace  / Bili: Negative / Urobili: Negative   Blood: x / Protein: 30 mg/dL / Nitrite: Negative   Leuk Esterase: Moderate / RBC: 0-2 /HPF / WBC 3-5   Sq Epi: x / Non Sq Epi: Few / Bacteria: Occasional      CAPILLARY BLOOD GLUCOSE              RADIOLOGY & ADDITIONAL TESTS:    < from: US Duplex Carotid Arteries Complete, Bilateral (22 @ 08:04) >  PROCEDURE DATE:  2022          INTERPRETATION:  CLINICAL INFORMATION: Syncope.    COMPARISON: None available.    TECHNIQUE: Grayscale, color and spectral Doppler examination of both   carotid arteries was performed.    FINDINGS:    There is atherosclerotic plaque bilateral carotid artery bifurcations and   bilateral internal carotid arteries.    Peak systolic velocities are as follows:    RIGHT:  PROX CCA = 52 cm/s  DIST CCA = 54 cm/s  PROX ICA = 37 cm/s  DIST ICA = 32 cm/s  ECA = 55 cm/s    LEFT:  PROX CCA = 60 cm/s  DIST CCA = 49 cm/s  PROX ICA = 62 cm per sec  DIST ICA = 34 cm/s  ECA = 96 cm/s    Antegrade flow is noted within both vertebral arteries.    IMPRESSION:    No significant hemodynamic stenosis of either carotid artery.    Measurement of carotid stenosis is based on velocity parameters that   correlate the residual internal carotid diameter with that of the more   distal vessel in accordance with a method such as the North American   Symptomatic Carotid Endarterectomy Trial (NASCET).    --- End of Report ---            YAJAIRA GERONIMO MD; Attending Radiologist  This document has been electronically signed. Aug  8 2022  9:29AM    < end of copied text >  < from: CT Head No Cont (22 @ 14:56) >  PROCEDURE DATE:  2022          INTERPRETATION:  CT head without IV contrast    CLINICAL INFORMATION:  Syncope   Intracranial hemorrhage.    TECHNIQUE: Contiguous axial 5 mm sections were obtained through the head.   Sagittal and coronal 2-D reformatted images were also obtained.   This   scan was performed using automatic exposure control (radiation dose   reduction software) to obtain a diagnostic image quality scan with   patient dose as low as reasonably achievable.    FINDINGS:   No previous examinations are available for review.    The brain demonstrates mild periventricular white matter ischemia. No   acute cerebral cortical infarct is seen.  No intracranial hemorrhage is   found.  No mass effect is found in the brain.    The ventricles, sulci and basal cisterns appear unremarkable.    The orbits are unremarkable.  The paranasal sinuses are clear.  The nasal   cavity appears intact.  The nasopharynx is symmetric.  The central skull   base, petrous temporal bones and calvarium remain intact.      IMPRESSION:   Mild periventricular white matter ischemia.    --- End of Report ---            LUIS CARLOS CASTILLO MD; Attending Radiologist  This document has been electronically signed. Aug  7 2022  3:15PM    < end of copied text >      Personally reviewed.     Consultant(s) Notes Reviewed:  [x] YES  [ ] NO     Patient is a 89y old  Female who presents with a chief complaint of AMS (09 Aug 2022 10:44)      Subjective:  INTERVAL HPI/OVERNIGHT EVENTS: Patient seen and examined at bedside. Overnight while asleep, patient was bradycardic in 50's and upon awaking, HR elevated into the 150's. HR was fluctuating between 100-150 but is stable as of this morning in the 70's. Pt is incoherent, mumbling, aggressive and trying to remove ekg and IV line. Pt is a poor historian. Unable to obtain ROS due to dementia. Pt has no other complaints at this time.     MEDICATIONS  (STANDING):  aspirin  chewable 81 milliGRAM(s) Oral daily  carvedilol 6.25 milliGRAM(s) Oral every 12 hours  heparin   Injectable 5000 Unit(s) SubCutaneous every 8 hours  hydrochlorothiazide 12.5 milliGRAM(s) Oral daily  levothyroxine 112 MICROGram(s) Oral daily  loratadine 10 milliGRAM(s) Oral daily  losartan 50 milliGRAM(s) Oral daily  memantine 15 milliGRAM(s) Oral daily  pantoprazole    Tablet 20 milliGRAM(s) Oral before breakfast  QUEtiapine 25 milliGRAM(s) Oral at bedtime  simvastatin 20 milliGRAM(s) Oral at bedtime    MEDICATIONS  (PRN):      Allergies    No Known Allergies    Intolerances        REVIEW OF SYSTEMS:  CONSTITUTIONAL: No fever or chills  HEENT:  No headache, no sore throat  RESPIRATORY: No cough, wheezing, or shortness of breath  CARDIOVASCULAR: No chest pain, palpitations  GASTROINTESTINAL: No abd pain, nausea, vomiting, or diarrhea  GENITOURINARY: No dysuria, frequency, or hematuria  NEUROLOGICAL: no focal weakness or dizziness  MUSCULOSKELETAL: no myalgias     Objective:  Vital Signs Last 24 Hrs  T(C): 37.2 (09 Aug 2022 04:01), Max: 37.2 (09 Aug 2022 04:01)  T(F): 98.9 (09 Aug 2022 04:01), Max: 98.9 (09 Aug 2022 04:01)  HR: 61 (09 Aug 2022 06:29) (61 - 77)  BP: 130/55 (09 Aug 2022 06:29) (119/71 - 132/69)  BP(mean): --  RR: 17 (09 Aug 2022 04:01) (17 - 17)  SpO2: 96% (09 Aug 2022 04:01) (96% - 98%)    Parameters below as of 09 Aug 2022 04:01  Patient On (Oxygen Delivery Method): room air        GENERAL: A&O 0, patient confused and incoherent.   HEAD:  Atraumatic, Normocephalic  EYES: EOMI, PERRLA, conjunctiva and sclera clear  ENT: Moist mucous membranes  NECK: Supple, No JVD  CHEST/LUNG: Clear to auscultation bilaterally; No rales, rhonchi, wheezing, or rubs. Unlabored respirations  HEART: Regular rate and rhythm; No murmurs, rubs, or gallops  ABDOMEN: Bowel sounds present; Soft, Nontender, Nondistended. No hepatomegaly  EXTREMITIES:  2+ Peripheral Pulses, brisk capillary refill. No clubbing, cyanosis, or edema  NERVOUS SYSTEM:  Alert & Oriented 0, speech clear. No deficits   MSK: FROM all 4 extremities, full and equal strength  SKIN: No rashes or lesions    LABS:                        9.8    5.52  )-----------( 143      ( 09 Aug 2022 08:42 )             30.3     CBC Full  -  ( 09 Aug 2022 08:42 )  WBC Count : 5.52 K/uL  Hemoglobin : 9.8 g/dL  Hematocrit : 30.3 %  Platelet Count - Automated : 143 K/uL  Mean Cell Volume : 92.7 fl  Mean Cell Hemoglobin : 30.0 pg  Mean Cell Hemoglobin Concentration : 32.3 gm/dL  Auto Neutrophil # : x  Auto Lymphocyte # : x  Auto Monocyte # : x  Auto Eosinophil # : x  Auto Basophil # : x  Auto Neutrophil % : x  Auto Lymphocyte % : x  Auto Monocyte % : x  Auto Eosinophil % : x  Auto Basophil % : x      Ca    8.7        08 Aug 2022 06:10        Urinalysis Basic - ( 07 Aug 2022 16:00 )    Color: Yellow / Appearance: Clear / S.025 / pH: x  Gluc: x / Ketone: Trace  / Bili: Negative / Urobili: Negative   Blood: x / Protein: 30 mg/dL / Nitrite: Negative   Leuk Esterase: Moderate / RBC: 0-2 /HPF / WBC 3-5   Sq Epi: x / Non Sq Epi: Few / Bacteria: Occasional      CAPILLARY BLOOD GLUCOSE              RADIOLOGY & ADDITIONAL TESTS:    < from: US Duplex Carotid Arteries Complete, Bilateral (08.08.22 @ 08:04) >  PROCEDURE DATE:  2022          INTERPRETATION:  CLINICAL INFORMATION: Syncope.    COMPARISON: None available.    TECHNIQUE: Grayscale, color and spectral Doppler examination of both   carotid arteries was performed.    FINDINGS:    There is atherosclerotic plaque bilateral carotid artery bifurcations and   bilateral internal carotid arteries.    Peak systolic velocities are as follows:    RIGHT:  PROX CCA = 52 cm/s  DIST CCA = 54 cm/s  PROX ICA = 37 cm/s  DIST ICA = 32 cm/s  ECA = 55 cm/s    LEFT:  PROX CCA = 60 cm/s  DIST CCA = 49 cm/s  PROX ICA = 62 cm per sec  DIST ICA = 34 cm/s  ECA = 96 cm/s    Antegrade flow is noted within both vertebral arteries.    IMPRESSION:    No significant hemodynamic stenosis of either carotid artery.    Measurement of carotid stenosis is based on velocity parameters that   correlate the residual internal carotid diameter with that of the more   distal vessel in accordance with a method such as the North American   Symptomatic Carotid Endarterectomy Trial (NASCET).    --- End of Report ---            YAJAIRA GERONIMO MD; Attending Radiologist  This document has been electronically signed. Aug  8 2022  9:29AM    < end of copied text >  < from: CT Head No Cont (22 @ 14:56) >  PROCEDURE DATE:  2022          INTERPRETATION:  CT head without IV contrast    CLINICAL INFORMATION:  Syncope   Intracranial hemorrhage.    TECHNIQUE: Contiguous axial 5 mm sections were obtained through the head.   Sagittal and coronal 2-D reformatted images were also obtained.   This   scan was performed using automatic exposure control (radiation dose   reduction software) to obtain a diagnostic image quality scan with   patient dose as low as reasonably achievable.    FINDINGS:   No previous examinations are available for review.    The brain demonstrates mild periventricular white matter ischemia. No   acute cerebral cortical infarct is seen.  No intracranial hemorrhage is   found.  No mass effect is found in the brain.    The ventricles, sulci and basal cisterns appear unremarkable.    The orbits are unremarkable.  The paranasal sinuses are clear.  The nasal   cavity appears intact.  The nasopharynx is symmetric.  The central skull   base, petrous temporal bones and calvarium remain intact.      IMPRESSION:   Mild periventricular white matter ischemia.    --- End of Report ---            LUIS CARLOS CASTILLO MD; Attending Radiologist  This document has been electronically signed. Aug  7 2022  3:15PM    < end of copied text >      Personally reviewed.     Consultant(s) Notes Reviewed:  [x] YES  [ ] NO

## 2022-08-09 NOTE — PROGRESS NOTE ADULT - PROBLEM SELECTOR PLAN 1
- pt came into the ED after 2 episodes of syncope and vomiting clear watery liquid (witnessed)   - suspect syncope in setting of hypovolemia/cerebral hypoperfusion. Doubt CVA.   - chronic low PO intake   - likely due to dehydration, less likely UTI.  - CT head showed mild periventricular white matter ischemia   - UA showed trace ketones, 30 protein, mod leuk est, trace blood, occasional bacteria  - On admission, WBC count 13.15, H/H 11.0/34.8   - s/p NS bolus x 2 in ED   - monitor on tele   - s/p IV fluids.   - D/c ceftriaxone   - f/u CXR, f/u blood culture, f/u urine culture, f/u echo read  - carotid dopplers negative for significant stenosis  - Speech and Swallow consulted, f/u recs  - PT consulted, rec is OLAYINKA, likely at this time.   - Cardio Pannella and Neuro Dr. Stout following.   - monitor hemodynamics  -blood culture  -Ziprexa (2.5) and Seroquil (up dose to 50 mg) - pt came into the ED after 2 episodes of syncope and vomiting clear watery liquid (witnessed)   - suspect syncope in setting of hypovolemia/cerebral hypoperfusion. Doubt CVA.   - chronic low PO intake   - likely due to dehydration, less likely UTI.  - CT head showed mild periventricular white matter ischemia   - UA showed trace ketones, 30 protein, mod leuk est, trace blood, occasional bacteria  - monitor on tele   - f/u CXR f/u blood culture, f/u urine culture, f/u echo read  - carotid dopplers negative for significant stenosis  - Speech and Swallow consulted, f/u recs  - PT consulted, rec is OLAYINKA, likely at this time.   - Cardio Darryl and Neuro Dr. Stout following.   - monitor hemodynamics - pt came into the ED after 2 episodes of syncope and vomiting clear watery liquid (witnessed)   - suspect syncope in setting of hypovolemia/cerebral hypoperfusion. Doubt CVA.   - chronic low PO intake   - likely due to dehydration, less likely UTI.  - CT head showed mild periventricular white matter ischemia   - UA showed trace ketones, 30 protein, mod leuk est, trace blood, occasional bacteria  - monitor on tele   - f/u CXR f/u blood culture, f/u urine culture, f/u echo read  - carotid dopplers negative for significant stenosis  - Speech and Swallow consulted, soft and bite sized diet with thin liquids.   - PT consulted, rec is OLAYINKA, likely at this time.   - Cardio Darryl and Neuro Dr. Stout following.   - monitor hemodynamics

## 2022-08-09 NOTE — DISCHARGE NOTE PROVIDER - ATTENDING DISCHARGE PHYSICAL EXAMINATION:
Objective:    Vitals:  T(C): 36.8 (08-09-22 @ 11:44), Max: 37.2 (08-09-22 @ 04:01)  HR: 60 (08-09-22 @ 11:44) (60 - 77)  BP: 155/83 (08-09-22 @ 11:44) (119/71 - 155/83)  RR: 16 (08-09-22 @ 11:44) (16 - 17)  SpO2: 91% (08-09-22 @ 11:44) (91% - 98%)    Physical Exam:  General: comfortable, no acute distress, well nourished  HEENT: Atraumatic, no LAD, trachea midline, PERRLA  Cardiovascular: normal s1s2, no murmurs, gallops or fricition rubs  Pulmonary: clear to ausculation Bilaterally, no wheezing , rhonchi  Gastrointestinal: soft non tender non distended, no masses felt, no organomegally  Muscloskeletal: no lower extremity edema, intact bilateral lower extremity pulses  Neurological: CN II-12 intact. alert oriented x 1  Psychiatrical: normal mood, cooperative  SKIN: no rash, lesions or ulcers

## 2022-08-09 NOTE — PROVIDER CONTACT NOTE (OTHER) - ASSESSMENT
While asleep pt was lucie in the 50s. Once woken up HR elevated. Pt asymptomatic. VSS. Denies chest pain, discomfort or palpations.

## 2022-08-09 NOTE — DISCHARGE NOTE PROVIDER - NSDCCPCAREPLAN_GEN_ALL_CORE_FT
PRINCIPAL DISCHARGE DIAGNOSIS  Diagnosis: Syncope  Assessment and Plan of Treatment: Syncope secondary to dehydration. Treated with IV fluids in the hospital. Follow up with primary care provider outpatient.      SECONDARY DISCHARGE DIAGNOSES  Diagnosis: HTN (hypertension)  Assessment and Plan of Treatment: Continue home medications.    Diagnosis: Hypothyroidism  Assessment and Plan of Treatment: Continue home medications.    Diagnosis: Alzheimer's dementia  Assessment and Plan of Treatment: Continue home medications.

## 2022-08-09 NOTE — PROVIDER CONTACT NOTE (OTHER) - BACKGROUND
Admitting Dx: Syncope & Collapse. Admitted for Metabolic Encephalopathy.   Pt has hx of hypertension.  Pt also has Alzheimer dementia.

## 2022-08-09 NOTE — PROGRESS NOTE ADULT - ATTENDING COMMENTS
89 yo F admitted for syncopal event and AMS. patient noted to have tachycardic episode overnight  Cards following awaiting echo  PT notes appreciated for alber  plan:  followup echo  dc planning

## 2022-08-09 NOTE — PROGRESS NOTE ADULT - PROBLEM SELECTOR PLAN 5
- s/p IVC filter placement 6-7 yrs ago  - continue home ASA 81 mg
- s/p IVC filter placement 6-7 yrs ago  - continue home ASA 81 mg

## 2022-08-09 NOTE — PROGRESS NOTE ADULT - ASSESSMENT
89 yo female with PMHx of alzheimer's dementia, HTN, hypothyroidism, PE (s/p IVC filter 6-7 yrs ago) presents to the ED after 2 episodes of slumping over at home.       - Unclear if she actually had syncope  - tele Sr no events overnight   - Ekg nsr   - Echo and carotid Doppler pending  - Continue aspirin, coreg, statin losartan hctz     Monitor and replete electrolytes. Keep K>4.0 and Mg>2.0.  Emily Pena FNP-C  Cardiology NP  [id number] [phone]   89 yo female with PMHx of alzheimer's dementia, HTN, hypothyroidism, PE (s/p IVC filter 6-7 yrs ago) presents to the ED after 2 episodes of slumping over at home.     - Unclear if she actually had syncope  - tele Sr no events overnight   - Ekg nsr   - Echo and carotid Doppler pending  - Continue aspirin, coreg, statin losartan hctz     Monitor and replete electrolytes. Keep K>4.0 and Mg>2.0.  Emily Pena FNP-C  Cardiology NP  [facility] [phone]

## 2022-08-09 NOTE — PROGRESS NOTE ADULT - SUBJECTIVE AND OBJECTIVE BOX
[facility] -- Saad Hsieh Pannella, Patel, Savella  Office # [phone]      Follow Up:    HTN   Subjective/Observations:   Seen at bedside on room air, unable to provide any meaningful information     REVIEW OF SYSTEMS: All other review of systems is negative unless indicated above    PAST MEDICAL & SURGICAL HISTORY:  HTN (hypertension)      HLD (hyperlipidemia)      Osteoporosis      Osteoarthrosis, localized, primary, involving lower leg      Hypothyroidism      Sleep apnea  h/o sleep apnea 20 yrs ago      Dementia      Alzheimer&#x27;s dementia      HTN (hypertension)      Hypothyroidism      History of pulmonary embolism      S/P hysterectomy  20 yrs ago for prolapsed uterus      Cystocele  Repair of cysocele 20 yrs ago      Osteoarthrosis  s/p left knee replacement 7 yrs ago      S/P IVC filter          MEDICATIONS  (STANDING):  aspirin  chewable 81 milliGRAM(s) Oral daily  carvedilol 6.25 milliGRAM(s) Oral every 12 hours  heparin   Injectable 5000 Unit(s) SubCutaneous every 8 hours  hydrochlorothiazide 12.5 milliGRAM(s) Oral daily  levothyroxine 112 MICROGram(s) Oral daily  loratadine 10 milliGRAM(s) Oral daily  losartan 50 milliGRAM(s) Oral daily  memantine 15 milliGRAM(s) Oral daily  pantoprazole    Tablet 20 milliGRAM(s) Oral before breakfast  QUEtiapine 25 milliGRAM(s) Oral at bedtime  simvastatin 20 milliGRAM(s) Oral at bedtime    MEDICATIONS  (PRN):      Allergies    No Known Allergies    Intolerances        Vital Signs Last 24 Hrs  T(C): 37.2 (09 Aug 2022 04:01), Max: 37.2 (09 Aug 2022 04:01)  T(F): 98.9 (09 Aug 2022 04:01), Max: 98.9 (09 Aug 2022 04:01)  HR: 61 (09 Aug 2022 06:29) (61 - 77)  BP: 130/55 (09 Aug 2022 06:29) (119/71 - 132/69)  BP(mean): --  RR: 17 (09 Aug 2022 04:01) (17 - 17)  SpO2: 96% (09 Aug 2022 04:01) (96% - 98%)    Parameters below as of 09 Aug 2022 04:01  Patient On (Oxygen Delivery Method): room air        I&O's Summary        PHYSICAL EXAM:  TELE: Sr   Constitutional: NAD,awake, confused   HEENT: Moist Mucous Membranes, Anicteric  Pulmonary: Non-labored, breath sounds are clear bilaterally, No wheezing, crackles or rhonchi  Cardiovascular: Regular, S1 and S2 nl, No murmurs, rubs, gallops or clicks  Gastrointestinal: Bowel Sounds present, soft, nontender.   Lymph: No lymphadenopathy. No peripheral edema.  Skin: No visible rashes or ulcers.    LABS: All Labs Reviewed:                        9.8    5.52  )-----------( 143      ( 09 Aug 2022 08:42 )             30.3                         9.8    6.55  )-----------( 128      ( 08 Aug 2022 06:10 )             30.2                         11.0   13.15 )-----------( 188      ( 07 Aug 2022 15:35 )             34.8     08 Aug 2022 06:10    141    |  109    |  31     ----------------------------<  87     4.0     |  25     |  1.10   07 Aug 2022 15:35    137    |  104    |  32     ----------------------------<  192    4.7     |  24     |  1.50     Ca    8.7        08 Aug 2022 06:10  Ca    9.4        07 Aug 2022 15:35    TPro  6.5    /  Alb  2.5    /  TBili  0.4    /  DBili  x      /  AST  13     /  ALT  10     /  AlkPhos  76     08 Aug 2022 06:10  TPro  7.4    /  Alb  3.0    /  TBili  0.5    /  DBili  x      /  AST  18     /  ALT  10     /  AlkPhos  90     07 Aug 2022 15:35             EC Lead ECG:   Ventricular Rate 62 BPM    Atrial Rate 62 BPM    P-R Interval 146 ms    QRS Duration 84 ms    Q-T Interval 450 ms    QTC Calculation(Bazett) 456 ms    P Axis 31 degrees    R Axis -11 degrees    T Axis 33 degrees    Diagnosis Line Normal sinus rhythm  Normal ECG  No previous ECGs available

## 2022-08-09 NOTE — PROGRESS NOTE ADULT - ASSESSMENT
58 yo female with PMHx of alzheimers dementia, HTN, hypothyroidism, PE (s/p IVC filter 6-7 yrs ago) presents to the ED after 2 episodes of slumping over at home. Admitted for metabolic encephalopathy

## 2022-08-09 NOTE — PROGRESS NOTE ADULT - SUBJECTIVE AND OBJECTIVE BOX
Neurology Follow up note    SONIA [id number]    HPI:  60 yo female with PMHx of alzheimers dementia, HTN, hypothyroidism, PE (s/p IVC filter 6-7 yrs ago) presents to the ED after 2 episodes of slumping over at home. History obtained from the son. Son states that pt had 2 episodes of slumping over for a few seconds. First episode was witnessed by pts . States that pt got tired during a conversation, slumped over a little bit with her eyes closed, and when sat up by family member she woke up and spit up. Second episode was witnessed by son. Son stated that she slumped over, closed her eyes, and then when he sat her up she vomited watery clear fluid. Family states that pt has chronically decreased PO intake.  states that she gets about 8 oz of water a day. Family states that this has never happened in the past. No recent sick contacts.     ED course  Vitals: T 97.2 , HR 56 , BP 80/50, RR 16, SpO2 95 % on RA  Significant labs: WBC 13.15, H/H 11.0/34.8, BUN 32, Cr 1.5  UA showed trace ketones, 30 protein, moderate leuk esterase, trace blood, occasional bacteria  Imaging: CT showed mild periventricular white matter ischemia   EKG performed   In ED patient given: NS bolus x2    (07 Aug 2022 17:48)      Interval History -no new events    Patient is seen, chart was reviewed and case was discussed with the treatment team.  Pt is not in any distress.   Lying on bed comfortably.     Vital Signs Last 24 Hrs  T(C): 36.8 (09 Aug 2022 11:44), Max: 37.2 (09 Aug 2022 04:01)  T(F): 98.3 (09 Aug 2022 11:44), Max: 98.9 (09 Aug 2022 04:01)  HR: 60 (09 Aug 2022 11:44) (60 - 69)  BP: 155/83 (09 Aug 2022 11:44) (119/71 - 155/83)  BP(mean): --  RR: 16 (09 Aug 2022 11:44) (16 - 17)  SpO2: 91% (09 Aug 2022 11:44) (91% - 97%)    Parameters below as of 09 Aug 2022 11:44  Patient On (Oxygen Delivery Method): room air            REVIEW OF SYSTEMS:    Constitutional: No fever, weight loss or fatigue  Eyes: No eye pain, visual disturbances, or discharge  ENT:  No difficulty hearing, tinnitus, vertigo; No sinus or throat pain  Neck: No pain or stiffness  Respiratory: No cough, wheezing, chills or hemoptysis  Cardiovascular: No chest pain, palpitations, shortness of breath, dizziness or leg swelling  Gastrointestinal: No abdominal or epigastric pain. No nausea, vomiting or hematemesis;  Genitourinary: No dysuria, frequency, hematuria or incontinence  Neurological: No headaches loss of strength, numbness or tremors  Psychiatric: No depression, anxiety, mood swings or difficulty sleeping  Musculoskeletal: No joint pain or swelling; No muscle, back or extremity pain  Skin: No itching, burning, rashes or lesions   Lymph Nodes: No enlarged glands  Endocrine: No heat or cold intolerance;   Allergy and Immunologic: No hives or eczema    On Neurological Examination:    Mental Status - Pt is , awake,  Follows simple comman    Speech -  Pt has  aphasia/    Cranial Nerves - Pupils 3 mm equal and reactive to light, extraocular eye movements intact. Pt has no visual field deficit.  Pt has no  facial asymmetry. Facial sensation is intact.Tongue - is in midline.    Muscle tone - is normal         Motor Exam - 4/5 all over, No drift. No shaking or tremors.    Sensory Exam - . Pt withdraws all extremities equally on stimulation. No asymmetry seen. No complaints of tingling, numbness.    Deep tendon Reflexes - 2 plus all over.            Neck Supple -  Yes.     MEDICATIONS    aspirin  chewable 81 milliGRAM(s) Oral daily  carvedilol 6.25 milliGRAM(s) Oral every 12 hours  heparin   Injectable 5000 Unit(s) SubCutaneous every 8 hours  hydrochlorothiazide 12.5 milliGRAM(s) Oral daily  levothyroxine 112 MICROGram(s) Oral daily  loratadine 10 milliGRAM(s) Oral daily  losartan 50 milliGRAM(s) Oral daily  memantine 15 milliGRAM(s) Oral daily  pantoprazole    Tablet 20 milliGRAM(s) Oral before breakfast  QUEtiapine 25 milliGRAM(s) Oral at bedtime  simvastatin 20 milliGRAM(s) Oral at bedtime      Allergies    No Known Allergies    Intolerances        LABS:  CBC Full  -  ( 09 Aug 2022 08:42 )  WBC Count : 5.52 K/uL  RBC Count : 3.27 M/uL  Hemoglobin : 9.8 g/dL  Hematocrit : 30.3 %  Platelet Count - Automated : 143 K/uL  Mean Cell Volume : 92.7 fl  Mean Cell Hemoglobin : 30.0 pg  Me      08-09    140  |  108  |  24<H>  ----------------------------<  90  3.9   |  23  |  0.84    Ca    9.0      09 Aug 2022 08:42    TPro  6.5  /  Alb  2.5<L>  /  TBili  0.4  /  DBili  x   /  AST  13<L>  /  ALT  10<L>  /  AlkPhos  76  08-08    Hemoglobin A1C:     Vitamin B12     RADIOLOGY    ASSESSMENT AND PLAN:      seen for loc   consistent with syncope  severe dementia    continue namenda  aricept dc 2/2 syncope  neuro follow up as out patient..

## 2022-08-09 NOTE — DISCHARGE NOTE NURSING/CASE MANAGEMENT/SOCIAL WORK - ADD ADDITIONAL RESOURCE
Telephone call placed to Bulmaro regarding her call to report that she is unable to drop off urine specimen.   LVM to return call to office and that she should have seen Dr Ventura.   
Add Additional Resource

## 2022-12-29 NOTE — SWALLOW BEDSIDE ASSESSMENT ADULT - MUCOSAL QUALITY
Please call and follow-up with Dr. Chan Garcia in her clinic within the next 1 to 2 days if you continue to have symptoms. Return if you have any trouble urinating or if your symptoms get worse. adequate and hydrated

## 2023-12-28 NOTE — PATIENT PROFILE ADULT - FUNCTIONAL ASSESSMENT - DAILY ACTIVITY 1.
Afebrile, hemodynamically stable, saturating well on room air  NAD, well appearing, sitting comfortably in chair, no WOB, speaking full sentences  Head NCAT, no CTL spine TTP/step-off/deformity  EOMI, anicteric  MMM  No JVD  RRR, nml S1/S2, no m/r/g  Lungs CTAB, no w/r/r  Abd soft, NT, ND, nml BS, no rebound or guarding  AAO, CN's 3-12 intact, motor 5/5 in all extremities  CORTEZ spontaneously, no leg cyanosis or edema, no extremity TTP/step-off/deformity  Skin warm, well perfused, no rashes or hives or seatbelt sign
3 = A little assistance

## 2024-09-10 NOTE — ED ADULT TRIAGE NOTE - TEMPERATURE IN FAHRENHEIT (DEGREES F)
TRIAGE NOTE   I saw the patient as the Clinician in Triage and performed a brief history and physical exam, established acuity, and ordered appropriate tests to develop basic plan of care. Patient will be seen by an [facility], resident and/or physician who will independently evaluate the patient. Please see subsequent provider notes for further details and disposition.     Brief HPI: In brief, Ludmila Chowdary is a 51 y.o. female with significant past medical history for HTN/HLD presenting to ED today from home by herself for evaluation of dizziness.  For the last week the patient has had a generalized headache/dizziness/left-sided chest pain and left arm tingling.  All symptoms have been intermittent, currently only a mild headache rated 4 out of 10.  No visual changes.  Denies fever/chills, cough/cold symptoms, shortness of breath, nausea/vomiting, abdominal pain, urinary symptoms, change in bowel habits or any other complaints.  No smoking, EtOH or drug use.  PCP is Dr. Guerra.    Focused Physical exam:   General: 51-year-old [place] female, awake and alert, oriented x 3.  Well-nourished and hydrated.  Nontoxic looking.  Skin: Pink, warm and dry.  Cardiac: Regular rate and rhythm.  Chest pain not reproducible.  Pulmonary: Lungs clear bilaterally.  No accessory muscle use or stridor.  Abdomen: Rounded and soft with bowel sounds, nontender.  Neuro: Cranial nerves II through XII grossly intact.  Normal gait.  Moves all extremities equally and symmetrically bilaterally.    Plan/MDM:   51-year-old female with known HTN/HLD is evaluated at the bedside for intermittent symptoms x 1 week including generalized headache, dizziness, left-sided chest pain and left arm tingling.  Currently the patient is only experiencing a mild headache.  No visual changes.  Vital signs within normal limits.  Afebrile.  Lungs clear, abdomen soft and nontender.  Chest pain not reproducible.  Will proceed with cardiac workup.  IV established, basic labs,  EKG, chest x-ray and head CT will be obtained.  Patient is agreeable to this plan.    Please see subsequent provider note for further details and disposition    97.2